# Patient Record
Sex: FEMALE | Race: BLACK OR AFRICAN AMERICAN | Employment: UNEMPLOYED | URBAN - METROPOLITAN AREA
[De-identification: names, ages, dates, MRNs, and addresses within clinical notes are randomized per-mention and may not be internally consistent; named-entity substitution may affect disease eponyms.]

---

## 2018-05-20 ENCOUNTER — HOSPITAL ENCOUNTER (EMERGENCY)
Facility: HOSPITAL | Age: 28
Discharge: HOME | End: 2018-05-20
Attending: EMERGENCY MEDICINE
Payer: COMMERCIAL

## 2018-05-20 VITALS
TEMPERATURE: 98 F | SYSTOLIC BLOOD PRESSURE: 118 MMHG | BODY MASS INDEX: 39.27 KG/M2 | HEART RATE: 70 BPM | RESPIRATION RATE: 18 BRPM | WEIGHT: 230 LBS | OXYGEN SATURATION: 100 % | HEIGHT: 64 IN | DIASTOLIC BLOOD PRESSURE: 89 MMHG

## 2018-05-20 DIAGNOSIS — N93.9 VAGINAL BLEEDING, ABNORMAL: Primary | ICD-10-CM

## 2018-05-20 PROCEDURE — 85025 COMPLETE CBC W/AUTO DIFF WBC: CPT | Performed by: EMERGENCY MEDICINE

## 2018-05-20 PROCEDURE — 81025 URINE PREGNANCY TEST: CPT

## 2018-05-20 PROCEDURE — 96374 THER/PROPH/DIAG INJ IV PUSH: CPT

## 2018-05-20 PROCEDURE — 96361 HYDRATE IV INFUSION ADD-ON: CPT

## 2018-05-20 PROCEDURE — 99284 EMERGENCY DEPT VISIT MOD MDM: CPT

## 2018-05-20 PROCEDURE — 80053 COMPREHEN METABOLIC PANEL: CPT | Performed by: EMERGENCY MEDICINE

## 2018-05-20 RX ORDER — LEVOTHYROXINE SODIUM 0.05 MG/1
50 TABLET ORAL
COMMUNITY

## 2018-05-20 RX ORDER — KETOROLAC TROMETHAMINE 30 MG/ML
30 INJECTION, SOLUTION INTRAMUSCULAR; INTRAVENOUS ONCE
Status: COMPLETED | OUTPATIENT
Start: 2018-05-20 | End: 2018-05-20

## 2018-05-20 RX ORDER — IBUPROFEN 200 MG
200 TABLET ORAL EVERY 6 HOURS PRN
COMMUNITY

## 2018-05-20 RX ORDER — IBUPROFEN 600 MG/1
600 TABLET ORAL EVERY 8 HOURS PRN
Qty: 30 TABLET | Refills: 0 | Status: SHIPPED | OUTPATIENT
Start: 2018-05-20 | End: 2018-05-27

## 2018-05-20 NOTE — ED INITIAL ASSESSMENT (HPI)
Pt presents to ER with heavy menstrual period. Pt states that she had not had a menstrual cycle for one year. Her period started on Thursday. Pt states that she needs to wear two pads at a time and has noted blood clots. Changing pad every 2-3hrs.  Yes jenna

## 2018-05-20 NOTE — ED PROVIDER NOTES
Patient Seen in: [facility] Emergency Department    History   Patient presents with:  Eval-G (gynecologic)    Stated Complaint: EVAL G    HPI    64-year-old -American female who presents emergent today for complaint of heavy menstrual bleedin alisa, she is awake and alert and appears in no acute discomfort or distress. Vital signs stable she is afebrile    Head is normocephalic atraumatic conjunctiva is clear. Sclerae anicteric. Neck is supple.     Lungs are clear to auscultation bilateral endocrine problems and now that these are being corrected that may be why she is having bleeding now. Patient will be discharged home on some ibuprofen for discomfort. She is to follow-up with her OB/GYN physician when she returns home.   She is to return

## (undated) NOTE — ED AVS SNAPSHOT
Stone Gilman   MRN: [id number]    Department:  [facility] Emergency Department   Date of Visit:  5/20/2018           Disclosure     Insurance plans vary and the physician(s) referred by the ER may not be covered by your plan.  Please contact yo tell this physician (or your personal doctor if your instructions are to return to your personal doctor) about any new or lasting problems. The primary care or specialist physician will see patients referred from the [facility] Emergency Department.  Barbie Carranza